# Patient Record
Sex: FEMALE | Race: WHITE | Employment: UNEMPLOYED | URBAN - METROPOLITAN AREA
[De-identification: names, ages, dates, MRNs, and addresses within clinical notes are randomized per-mention and may not be internally consistent; named-entity substitution may affect disease eponyms.]

---

## 2021-04-27 ENCOUNTER — LAB ENCOUNTER (OUTPATIENT)
Dept: LAB | Facility: HOSPITAL | Age: 46
End: 2021-04-27
Attending: INTERNAL MEDICINE
Payer: MEDICAID

## 2021-04-27 DIAGNOSIS — Z01.818 PREOPERATIVE TESTING: ICD-10-CM

## 2021-04-29 ENCOUNTER — HOSPITAL ENCOUNTER (OUTPATIENT)
Facility: HOSPITAL | Age: 46
Setting detail: HOSPITAL OUTPATIENT SURGERY
Discharge: HOME | End: 2021-04-29
Attending: INTERNAL MEDICINE | Admitting: INTERNAL MEDICINE
Payer: MEDICAID

## 2021-04-29 ENCOUNTER — ANESTHESIA (OUTPATIENT)
Dept: ENDOSCOPY | Facility: HOSPITAL | Age: 46
End: 2021-04-29
Payer: MEDICAID

## 2021-04-29 ENCOUNTER — ANESTHESIA EVENT (OUTPATIENT)
Dept: ENDOSCOPY | Facility: HOSPITAL | Age: 46
End: 2021-04-29
Payer: MEDICAID

## 2021-04-29 VITALS
HEIGHT: 64 IN | SYSTOLIC BLOOD PRESSURE: 121 MMHG | OXYGEN SATURATION: 99 % | DIASTOLIC BLOOD PRESSURE: 86 MMHG | BODY MASS INDEX: 19.63 KG/M2 | RESPIRATION RATE: 16 BRPM | WEIGHT: 115 LBS | HEART RATE: 91 BPM | TEMPERATURE: 98 F

## 2021-04-29 DIAGNOSIS — Z01.818 PREOPERATIVE TESTING: Primary | ICD-10-CM

## 2021-04-29 DIAGNOSIS — R19.7 DIARRHEA, UNSPECIFIED TYPE: ICD-10-CM

## 2021-04-29 DIAGNOSIS — K62.89 RECTAL PAIN: ICD-10-CM

## 2021-04-29 DIAGNOSIS — K52.9 CHRONIC COLITIS: ICD-10-CM

## 2021-04-29 PROCEDURE — 81025 URINE PREGNANCY TEST: CPT

## 2021-04-29 PROCEDURE — 88305 TISSUE EXAM BY PATHOLOGIST: CPT | Performed by: INTERNAL MEDICINE

## 2021-04-29 PROCEDURE — 0DBP8ZX EXCISION OF RECTUM, VIA NATURAL OR ARTIFICIAL OPENING ENDOSCOPIC, DIAGNOSTIC: ICD-10-PCS | Performed by: INTERNAL MEDICINE

## 2021-04-29 PROCEDURE — 0DBK8ZX EXCISION OF ASCENDING COLON, VIA NATURAL OR ARTIFICIAL OPENING ENDOSCOPIC, DIAGNOSTIC: ICD-10-PCS | Performed by: INTERNAL MEDICINE

## 2021-04-29 PROCEDURE — 0DBH8ZX EXCISION OF CECUM, VIA NATURAL OR ARTIFICIAL OPENING ENDOSCOPIC, DIAGNOSTIC: ICD-10-PCS | Performed by: INTERNAL MEDICINE

## 2021-04-29 PROCEDURE — 0DBL8ZX EXCISION OF TRANSVERSE COLON, VIA NATURAL OR ARTIFICIAL OPENING ENDOSCOPIC, DIAGNOSTIC: ICD-10-PCS | Performed by: INTERNAL MEDICINE

## 2021-04-29 PROCEDURE — 0DBM8ZX EXCISION OF DESCENDING COLON, VIA NATURAL OR ARTIFICIAL OPENING ENDOSCOPIC, DIAGNOSTIC: ICD-10-PCS | Performed by: INTERNAL MEDICINE

## 2021-04-29 PROCEDURE — 0DBN8ZX EXCISION OF SIGMOID COLON, VIA NATURAL OR ARTIFICIAL OPENING ENDOSCOPIC, DIAGNOSTIC: ICD-10-PCS | Performed by: INTERNAL MEDICINE

## 2021-04-29 RX ORDER — SODIUM CHLORIDE, SODIUM LACTATE, POTASSIUM CHLORIDE, CALCIUM CHLORIDE 600; 310; 30; 20 MG/100ML; MG/100ML; MG/100ML; MG/100ML
INJECTION, SOLUTION INTRAVENOUS CONTINUOUS
Status: DISCONTINUED | OUTPATIENT
Start: 2021-04-29 | End: 2021-04-29

## 2021-04-29 RX ORDER — NALOXONE HYDROCHLORIDE 0.4 MG/ML
80 INJECTION, SOLUTION INTRAMUSCULAR; INTRAVENOUS; SUBCUTANEOUS AS NEEDED
Status: DISCONTINUED | OUTPATIENT
Start: 2021-04-29 | End: 2021-04-29

## 2021-04-29 RX ORDER — LIDOCAINE HYDROCHLORIDE 10 MG/ML
INJECTION, SOLUTION EPIDURAL; INFILTRATION; INTRACAUDAL; PERINEURAL AS NEEDED
Status: DISCONTINUED | OUTPATIENT
Start: 2021-04-29 | End: 2021-04-29 | Stop reason: SURG

## 2021-04-29 RX ADMIN — SODIUM CHLORIDE, SODIUM LACTATE, POTASSIUM CHLORIDE, CALCIUM CHLORIDE: 600; 310; 30; 20 INJECTION, SOLUTION INTRAVENOUS at 13:22:00

## 2021-04-29 RX ADMIN — SODIUM CHLORIDE, SODIUM LACTATE, POTASSIUM CHLORIDE, CALCIUM CHLORIDE: 600; 310; 30; 20 INJECTION, SOLUTION INTRAVENOUS at 12:41:00

## 2021-04-29 RX ADMIN — LIDOCAINE HYDROCHLORIDE 50 MG: 10 INJECTION, SOLUTION EPIDURAL; INFILTRATION; INTRACAUDAL; PERINEURAL at 12:44:00

## 2021-04-29 NOTE — OPERATIVE REPORT
Colonoscopy Operative Report    Milton Sousa Patient Status:  Hospital Outpatient Surgery    11/3/1975 MRN [id number]   Location [facility] ENDOSCOPY LAB SUITES Attending Brandon Maynard MD   [facility] Day # complications. The patient was transferred to the recovery area in stable condition. Quality of Preparation: Adequate  Aronchick Bowel Prep Scale: 2    Findings: See above (post-operative diagnosis)  Recommendations: 1. No changes to diet  2. Changes to m

## 2021-04-29 NOTE — ANESTHESIA PREPROCEDURE EVALUATION
Anesthesia PreOp Note    HPI:     Jamila Willett is a 39year old female who presents for preoperative consultation requested by: Evangelina Valladares MD    Date of Surgery: 4/29/2021    Procedure(s):  COLONOSCOPY  Indication: Rectal pain, Chronic colitis, Diarrhea, un education: Not on file      Highest education level: Not on file    Occupational History      Not on file    Tobacco Use      Smoking status: Never Smoker      Smokeless tobacco: Never Used    Vaping Use      Vaping Use: Never used    Substance and Sexual not taken for this visit. There were no vitals filed for this visit.      Anesthesia Evaluation     Patient summary reviewed and Nursing notes reviewed    Airway   Mallampati: II  TM distance: >3 FB  Neck ROM: full  Dental          Pulmonary - normal exam

## 2021-04-29 NOTE — ANESTHESIA POSTPROCEDURE EVALUATION
Patient: Jacinda Morrison    Procedure Summary     Date: 04/29/21 Room / Location: [address] [place] [address] ENDOSCOPY 05 / [address] [place] [address] ENDOSCOPY    Anesthesia Start: 9897 Anesthesia Stop: 5022    Procedure: COLONOSCOPY (N/A ) Diagnosis:       Rectal pain      Chronic colitis      Diarrhea, u

## 2021-04-29 NOTE — H&P
The H&P dated 4/22/2021 by Abram Anderson MD was reviewed by Abram Anderson MD today 4/29/2021, the patient was examined and no significant changes have occurred in the patient's condition since the H&P was performed.     I discussed with the patient and/or legal No  History of mobility issues: No  History of C-Diff Colitis:No  History of MRSA: No  History of abdominal surgeries:  none     ENDOSCOPIC REPORTS:   Colonoscopy:none  EGD:none  ERCP:none     History of Prep or Sedation intolerance with previous endoscopi REVIEW OF SYSTEMS:      GENERAL: feels well otherwise.   SKIN: denies any unusual skin lesions  EYES: denies any new visual changes or double vision, denies red painful eyes  HEENT: denies any new hearing changes, nasal congestion, sinus pain  LUNGS: uriel g/dL     Bilirubin, Total 0.37 0.00 - 1.20 mg/dL     Alkaline Phosphatase 84 37 - 98 U/L     AST 16 0 - 32 U/L     ALT 19 0 - 33 U/L     GFR CKD-.34 >=60.00 mL/min/1.73 m²   2019 NOVEL CORONAVIRUS (COVID-19), PCR DMG     Specimen: Nasopharyngeal swa plan.     Plan for OC with possible biopsies and possible polypectomy.   The procedure, indications, and risks (including perforation, bleeding, infection, and sedation related complications) were discussed.       PEG preparation, use discussed and instruct

## 2021-05-05 ENCOUNTER — LAB ENCOUNTER (OUTPATIENT)
Dept: LAB | Age: 46
End: 2021-05-05
Attending: COLON & RECTAL SURGERY
Payer: MEDICAID

## 2021-05-05 DIAGNOSIS — Z01.818 PREOPERATIVE TESTING: ICD-10-CM

## 2021-05-06 ENCOUNTER — ANESTHESIA EVENT (OUTPATIENT)
Dept: SURGERY | Facility: HOSPITAL | Age: 46
End: 2021-05-06
Payer: MEDICAID

## 2021-05-06 ENCOUNTER — HOSPITAL ENCOUNTER (OUTPATIENT)
Facility: HOSPITAL | Age: 46
Setting detail: HOSPITAL OUTPATIENT SURGERY
Discharge: HOME | End: 2021-05-06
Attending: COLON & RECTAL SURGERY | Admitting: COLON & RECTAL SURGERY
Payer: MEDICAID

## 2021-05-06 ENCOUNTER — ANESTHESIA (OUTPATIENT)
Dept: SURGERY | Facility: HOSPITAL | Age: 46
End: 2021-05-06
Payer: MEDICAID

## 2021-05-06 VITALS
RESPIRATION RATE: 18 BRPM | OXYGEN SATURATION: 100 % | HEIGHT: 64 IN | TEMPERATURE: 98 F | SYSTOLIC BLOOD PRESSURE: 131 MMHG | BODY MASS INDEX: 18.78 KG/M2 | DIASTOLIC BLOOD PRESSURE: 75 MMHG | WEIGHT: 110 LBS | HEART RATE: 79 BPM

## 2021-05-06 DIAGNOSIS — K50.113: ICD-10-CM

## 2021-05-06 DIAGNOSIS — Z01.818 PREOPERATIVE TESTING: Primary | ICD-10-CM

## 2021-05-06 DIAGNOSIS — K60.3 ANAL FISTULA: ICD-10-CM

## 2021-05-06 PROCEDURE — 0DBQ8ZZ EXCISION OF ANUS, VIA NATURAL OR ARTIFICIAL OPENING ENDOSCOPIC: ICD-10-PCS | Performed by: COLON & RECTAL SURGERY

## 2021-05-06 PROCEDURE — 81025 URINE PREGNANCY TEST: CPT

## 2021-05-06 RX ORDER — HYDROCODONE BITARTRATE AND ACETAMINOPHEN 5; 325 MG/1; MG/1
2 TABLET ORAL AS NEEDED
Status: DISCONTINUED | OUTPATIENT
Start: 2021-05-06 | End: 2021-05-06

## 2021-05-06 RX ORDER — ACETAMINOPHEN 500 MG
1000 TABLET ORAL ONCE
Status: DISCONTINUED | OUTPATIENT
Start: 2021-05-06 | End: 2021-05-06 | Stop reason: HOSPADM

## 2021-05-06 RX ORDER — MORPHINE SULFATE 4 MG/ML
4 INJECTION, SOLUTION INTRAMUSCULAR; INTRAVENOUS EVERY 10 MIN PRN
Status: DISCONTINUED | OUTPATIENT
Start: 2021-05-06 | End: 2021-05-06

## 2021-05-06 RX ORDER — HYDROCODONE BITARTRATE AND ACETAMINOPHEN 5; 325 MG/1; MG/1
1 TABLET ORAL EVERY 4 HOURS PRN
Qty: 30 TABLET | Refills: 0 | Status: SHIPPED | OUTPATIENT
Start: 2021-05-06 | End: 2021-05-16

## 2021-05-06 RX ORDER — HYDROMORPHONE HYDROCHLORIDE 1 MG/ML
0.6 INJECTION, SOLUTION INTRAMUSCULAR; INTRAVENOUS; SUBCUTANEOUS EVERY 5 MIN PRN
Status: DISCONTINUED | OUTPATIENT
Start: 2021-05-06 | End: 2021-05-06

## 2021-05-06 RX ORDER — SODIUM CHLORIDE, SODIUM LACTATE, POTASSIUM CHLORIDE, CALCIUM CHLORIDE 600; 310; 30; 20 MG/100ML; MG/100ML; MG/100ML; MG/100ML
INJECTION, SOLUTION INTRAVENOUS CONTINUOUS
Status: DISCONTINUED | OUTPATIENT
Start: 2021-05-06 | End: 2021-05-06

## 2021-05-06 RX ORDER — MORPHINE SULFATE 10 MG/ML
6 INJECTION, SOLUTION INTRAMUSCULAR; INTRAVENOUS EVERY 10 MIN PRN
Status: DISCONTINUED | OUTPATIENT
Start: 2021-05-06 | End: 2021-05-06

## 2021-05-06 RX ORDER — DEXAMETHASONE SODIUM PHOSPHATE 4 MG/ML
VIAL (ML) INJECTION AS NEEDED
Status: DISCONTINUED | OUTPATIENT
Start: 2021-05-06 | End: 2021-05-06 | Stop reason: SURG

## 2021-05-06 RX ORDER — ONDANSETRON 2 MG/ML
INJECTION INTRAMUSCULAR; INTRAVENOUS AS NEEDED
Status: DISCONTINUED | OUTPATIENT
Start: 2021-05-06 | End: 2021-05-06 | Stop reason: SURG

## 2021-05-06 RX ORDER — MORPHINE SULFATE 4 MG/ML
2 INJECTION, SOLUTION INTRAMUSCULAR; INTRAVENOUS EVERY 10 MIN PRN
Status: DISCONTINUED | OUTPATIENT
Start: 2021-05-06 | End: 2021-05-06

## 2021-05-06 RX ORDER — HYDROMORPHONE HYDROCHLORIDE 1 MG/ML
0.4 INJECTION, SOLUTION INTRAMUSCULAR; INTRAVENOUS; SUBCUTANEOUS EVERY 5 MIN PRN
Status: DISCONTINUED | OUTPATIENT
Start: 2021-05-06 | End: 2021-05-06

## 2021-05-06 RX ORDER — LIDOCAINE HYDROCHLORIDE 10 MG/ML
INJECTION, SOLUTION EPIDURAL; INFILTRATION; INTRACAUDAL; PERINEURAL AS NEEDED
Status: DISCONTINUED | OUTPATIENT
Start: 2021-05-06 | End: 2021-05-06 | Stop reason: SURG

## 2021-05-06 RX ORDER — NALOXONE HYDROCHLORIDE 0.4 MG/ML
80 INJECTION, SOLUTION INTRAMUSCULAR; INTRAVENOUS; SUBCUTANEOUS AS NEEDED
Status: DISCONTINUED | OUTPATIENT
Start: 2021-05-06 | End: 2021-05-06

## 2021-05-06 RX ORDER — HYDROCODONE BITARTRATE AND ACETAMINOPHEN 5; 325 MG/1; MG/1
1 TABLET ORAL AS NEEDED
Status: DISCONTINUED | OUTPATIENT
Start: 2021-05-06 | End: 2021-05-06

## 2021-05-06 RX ORDER — HYDROMORPHONE HYDROCHLORIDE 1 MG/ML
0.2 INJECTION, SOLUTION INTRAMUSCULAR; INTRAVENOUS; SUBCUTANEOUS EVERY 5 MIN PRN
Status: DISCONTINUED | OUTPATIENT
Start: 2021-05-06 | End: 2021-05-06

## 2021-05-06 RX ORDER — ONDANSETRON 2 MG/ML
4 INJECTION INTRAMUSCULAR; INTRAVENOUS ONCE AS NEEDED
Status: DISCONTINUED | OUTPATIENT
Start: 2021-05-06 | End: 2021-05-06

## 2021-05-06 RX ORDER — HALOPERIDOL 5 MG/ML
0.25 INJECTION INTRAMUSCULAR ONCE AS NEEDED
Status: DISCONTINUED | OUTPATIENT
Start: 2021-05-06 | End: 2021-05-06

## 2021-05-06 RX ORDER — ROCURONIUM BROMIDE 10 MG/ML
INJECTION, SOLUTION INTRAVENOUS AS NEEDED
Status: DISCONTINUED | OUTPATIENT
Start: 2021-05-06 | End: 2021-05-06 | Stop reason: SURG

## 2021-05-06 RX ADMIN — SODIUM CHLORIDE, SODIUM LACTATE, POTASSIUM CHLORIDE, CALCIUM CHLORIDE: 600; 310; 30; 20 INJECTION, SOLUTION INTRAVENOUS at 16:06:00

## 2021-05-06 RX ADMIN — SODIUM CHLORIDE, SODIUM LACTATE, POTASSIUM CHLORIDE, CALCIUM CHLORIDE: 600; 310; 30; 20 INJECTION, SOLUTION INTRAVENOUS at 16:50:00

## 2021-05-06 RX ADMIN — ROCURONIUM BROMIDE 50 MG: 10 INJECTION, SOLUTION INTRAVENOUS at 16:09:00

## 2021-05-06 RX ADMIN — DEXAMETHASONE SODIUM PHOSPHATE 8 MG: 4 MG/ML VIAL (ML) INJECTION at 16:43:00

## 2021-05-06 RX ADMIN — ONDANSETRON 4 MG: 2 INJECTION INTRAMUSCULAR; INTRAVENOUS at 16:43:00

## 2021-05-06 RX ADMIN — LIDOCAINE HYDROCHLORIDE 50 MG: 10 INJECTION, SOLUTION EPIDURAL; INFILTRATION; INTRACAUDAL; PERINEURAL at 16:08:00

## 2021-05-06 NOTE — H&P
[facility]  Preop H&P - Colon and Rectal Surgery  Harsh Engle MD    CHIEF COMPLAINT:  Anal fistula     HISTORY OF PRESENT ILLNESS:  Jamila Willett is a 39year old female who presents for evaluation of anal fistula.     She has been experiencing freq loss  Eyes: Corrective lenses   Ears/Nose/Throat: Tinnitus, occasional mouth sores, sore throat, sinus congestion TMJ  Respiratory:  normal  Cardiac/Vascular: Occasions, calf pain with walking  GI: See HPI, occasional decreased appetite, occasional to the out of 5 all extremities bilaterally.    SKIN:  no rashes and no jaundice  PSYCHIATRIC:       Orientation:  normal     Appearance:  normal     Behavior:  normal     Attitude toward examiner:  normal     Affect: normal     Judgment:  Normal    DATA:   COLONO changes identified may be seen in inflammatory bowel disease, infection, diverticulosis, and medications. Clinical and endoscopic correlation is recommended.          MRI Pelvis 5/1/2021  IMPRESSION:   1.  Complex fistula at the 9 o'clock position relative

## 2021-05-06 NOTE — OPERATIVE REPORT
Operative Note    Patient Name: Lalo Bowens    Date of Surgery: 05/06/21     Preoperative Diagnosis: Anal fistula [K60.3]  [facility]'s disease of rectum, with [facility] ([facility] [K50.113]    Postoperative Diagnosis: same    Primary Surgeon: Giselle Hudson    Assistant: anal sphincter traversed by the fistula and to be cut during fistulotomy was estimated to be 15%. Given the presence of Crohn's Disease, we decided to place a seton.  This was done by tying a silk suture to the probe and then a medium blue silastic vessel l

## 2021-05-06 NOTE — DISCHARGE SUMMARY
Outpatient Surgery Brief Discharge Summary         Patient ID:  Rohini Guzman  [id number]  39year old  11/3/1975    Discharge Diagnoses: Anal fistula [K60. 3]Crohn's disease of rectum, with fistula ([facility] [K50.113]    Procedures:   Discharge Procedure Orders

## 2021-05-06 NOTE — ANESTHESIA PROCEDURE NOTES
Airway  Date/Time: 5/6/2021 4:15 PM  Urgency: Elective    Airway not difficult    General Information and Staff    Patient location during procedure: OR  Anesthesiologist: Demetrice Waller MD  Resident/CRNA: Loretta Hook CRNA  Performed: CRNA     Indicat

## 2021-05-06 NOTE — ANESTHESIA POSTPROCEDURE EVALUATION
Patient: Marisabel Sargent    Procedure Summary     Date: 05/06/21 Room / Location: [facility] MAIN OR [address] [place] [address] MAIN OR    Anesthesia Start: 4837 Anesthesia Stop: 2680    Procedure: PROCTOSCOPY, ANORECTAL EXAM UNDER ANESTHEIA,  FISTULOTOMY (N/A ) Diagnosis:       Anal

## 2021-05-06 NOTE — ANESTHESIA PREPROCEDURE EVALUATION
Anesthesia PreOp Note    HPI:     Clay Huang is a 39year old female who presents for preoperative consultation requested by: Chitra Collier MD    Date of Surgery: 5/6/2021    Procedure(s):  PROCTOSCOPY, ANORECTAL EXAM UNDER ANESTHEIA,  FISTULOTOMY  Indicat mouth daily. , Disp: , Rfl: , Past Week at Unknown time  Vitamin A, [id number], (VITAMIN A OR), Take by mouth daily. , Disp: , Rfl:   diazepam 2 MG Oral Tab, Take one tab 15 minutes prior to MRI, Disp: 1 tablet, Rfl: 0  Potassium Chloride ER 10 MEQ Oral Tab of Exercise per Week:       Minutes of Exercise per Session:   Stress:       Feeling of Stress :   Social Connections:       Frequency of Communication with Friends and Family:       Frequency of Social Gatherings with Friends and Family:       Attends Rel Neuro/Psych    (+)  anxiety/panic attacks,  depression,       GI/Hepatic/Renal      Comments: + anal fistula    Endo/Other      Comments: + cleft palate  Abdominal  - normal exam               Anesthesia Plan:   ASA:  2  Plan:   General  Monitors and Lines

## 2021-05-12 PROBLEM — K52.9 COLITIS: Status: ACTIVE | Noted: 2021-05-12

## 2021-05-21 RX ORDER — DIPHENHYDRAMINE HYDROCHLORIDE 50 MG/ML
25 INJECTION INTRAMUSCULAR; INTRAVENOUS ONCE
Status: CANCELLED | OUTPATIENT
Start: 2021-05-21

## 2021-05-24 ENCOUNTER — OFFICE VISIT (OUTPATIENT)
Dept: HEMATOLOGY/ONCOLOGY | Facility: HOSPITAL | Age: 46
End: 2021-05-24
Attending: INTERNAL MEDICINE
Payer: MEDICAID

## 2021-05-24 VITALS
HEART RATE: 79 BPM | SYSTOLIC BLOOD PRESSURE: 101 MMHG | BODY MASS INDEX: 18.78 KG/M2 | OXYGEN SATURATION: 99 % | WEIGHT: 110 LBS | DIASTOLIC BLOOD PRESSURE: 62 MMHG | HEIGHT: 64 IN | RESPIRATION RATE: 16 BRPM | TEMPERATURE: 98 F

## 2021-05-24 DIAGNOSIS — K52.9 COLITIS: Primary | ICD-10-CM

## 2021-05-24 PROCEDURE — 96415 CHEMO IV INFUSION ADDL HR: CPT

## 2021-05-24 PROCEDURE — 96413 CHEMO IV INFUSION 1 HR: CPT

## 2021-05-24 RX ORDER — ACETAMINOPHEN 325 MG/1
650 TABLET ORAL ONCE
Status: CANCELLED | OUTPATIENT
Start: 2021-06-07

## 2021-05-24 RX ORDER — ACETAMINOPHEN 325 MG/1
650 TABLET ORAL ONCE
Status: COMPLETED | OUTPATIENT
Start: 2021-05-24 | End: 2021-05-24

## 2021-05-24 RX ORDER — DIPHENHYDRAMINE HYDROCHLORIDE 50 MG/ML
25 INJECTION INTRAMUSCULAR; INTRAVENOUS ONCE
Status: CANCELLED | OUTPATIENT
Start: 2021-06-07

## 2021-05-24 RX ORDER — DIPHENHYDRAMINE HCL 25 MG
CAPSULE ORAL
Status: COMPLETED
Start: 2021-05-24 | End: 2021-05-24

## 2021-05-24 RX ORDER — DIPHENHYDRAMINE HCL 25 MG
25 CAPSULE ORAL ONCE
Status: COMPLETED | OUTPATIENT
Start: 2021-05-24 | End: 2021-05-24

## 2021-05-24 RX ORDER — ACETAMINOPHEN 325 MG/1
TABLET ORAL
Status: COMPLETED
Start: 2021-05-24 | End: 2021-05-24

## 2021-05-24 RX ORDER — DIPHENHYDRAMINE HCL 25 MG
25 CAPSULE ORAL ONCE
Status: CANCELLED | OUTPATIENT
Start: 2021-06-07

## 2021-05-24 RX ADMIN — ACETAMINOPHEN 650 MG: 325 TABLET ORAL at 09:45:00

## 2021-05-24 RX ADMIN — DIPHENHYDRAMINE HCL 25 MG: 25 MG CAPSULE ORAL at 09:46:00

## 2021-05-24 NOTE — PROGRESS NOTES
Susanna Luz arrives to infusion area for her first dose of Remicade to treat Crohn's. States she has been having periods of diarrhea and abdominal cramping. She did have a Seton placed to keep her anal fistula from closing.  She states she just keeps a gauze dress

## 2021-06-07 ENCOUNTER — OFFICE VISIT (OUTPATIENT)
Dept: HEMATOLOGY/ONCOLOGY | Facility: HOSPITAL | Age: 46
End: 2021-06-07
Attending: INTERNAL MEDICINE
Payer: MEDICAID

## 2021-06-07 VITALS
DIASTOLIC BLOOD PRESSURE: 68 MMHG | HEART RATE: 65 BPM | SYSTOLIC BLOOD PRESSURE: 113 MMHG | OXYGEN SATURATION: 98 % | TEMPERATURE: 98 F | RESPIRATION RATE: 16 BRPM

## 2021-06-07 DIAGNOSIS — K52.9 COLITIS: Primary | ICD-10-CM

## 2021-06-07 PROCEDURE — 96415 CHEMO IV INFUSION ADDL HR: CPT

## 2021-06-07 PROCEDURE — 96413 CHEMO IV INFUSION 1 HR: CPT

## 2021-06-07 RX ORDER — ACETAMINOPHEN 325 MG/1
650 TABLET ORAL ONCE
Status: COMPLETED | OUTPATIENT
Start: 2021-06-07 | End: 2021-06-07

## 2021-06-07 RX ORDER — DIPHENHYDRAMINE HCL 25 MG
25 CAPSULE ORAL ONCE
Status: COMPLETED | OUTPATIENT
Start: 2021-06-07 | End: 2021-06-07

## 2021-06-07 RX ORDER — DIPHENHYDRAMINE HCL 25 MG
25 CAPSULE ORAL ONCE
Status: CANCELLED | OUTPATIENT
Start: 2021-06-21

## 2021-06-07 RX ORDER — DIPHENHYDRAMINE HYDROCHLORIDE 50 MG/ML
25 INJECTION INTRAMUSCULAR; INTRAVENOUS ONCE
Status: CANCELLED | OUTPATIENT
Start: 2021-06-21

## 2021-06-07 RX ORDER — ACETAMINOPHEN 325 MG/1
650 TABLET ORAL ONCE
Status: CANCELLED | OUTPATIENT
Start: 2021-06-21

## 2021-06-07 RX ORDER — ACETAMINOPHEN 325 MG/1
TABLET ORAL
Status: COMPLETED
Start: 2021-06-07 | End: 2021-06-07

## 2021-06-07 RX ORDER — DIPHENHYDRAMINE HCL 25 MG
CAPSULE ORAL
Status: COMPLETED
Start: 2021-06-07 | End: 2021-06-07

## 2021-06-07 RX ADMIN — DIPHENHYDRAMINE HCL 25 MG: 25 MG CAPSULE ORAL at 09:31:00

## 2021-06-07 RX ADMIN — ACETAMINOPHEN 650 MG: 325 TABLET ORAL at 09:32:00

## 2021-06-07 NOTE — PROGRESS NOTES
Radha Giang arrives to infusion area for her first dose of Remicade to treat Crohn's. States she has been having periods of diarrhea and abdominal cramping. She did say she the diarrhea is less since her first Remicade 2 weeks ago.   She did have a Seton placed to

## 2021-07-06 ENCOUNTER — OFFICE VISIT (OUTPATIENT)
Dept: HEMATOLOGY/ONCOLOGY | Facility: HOSPITAL | Age: 46
End: 2021-07-06
Attending: INTERNAL MEDICINE
Payer: MEDICAID

## 2021-07-06 VITALS
BODY MASS INDEX: 19 KG/M2 | RESPIRATION RATE: 16 BRPM | OXYGEN SATURATION: 99 % | DIASTOLIC BLOOD PRESSURE: 65 MMHG | SYSTOLIC BLOOD PRESSURE: 130 MMHG | HEART RATE: 74 BPM | TEMPERATURE: 98 F | WEIGHT: 112.19 LBS

## 2021-07-06 DIAGNOSIS — K52.9 COLITIS: Primary | ICD-10-CM

## 2021-07-06 PROCEDURE — 96415 CHEMO IV INFUSION ADDL HR: CPT

## 2021-07-06 PROCEDURE — 96413 CHEMO IV INFUSION 1 HR: CPT

## 2021-07-06 RX ORDER — DIPHENHYDRAMINE HYDROCHLORIDE 50 MG/ML
25 INJECTION INTRAMUSCULAR; INTRAVENOUS ONCE
Status: CANCELLED | OUTPATIENT
Start: 2021-07-20

## 2021-07-06 RX ORDER — ACETAMINOPHEN 325 MG/1
650 TABLET ORAL ONCE
Status: CANCELLED | OUTPATIENT
Start: 2021-07-20

## 2021-07-06 RX ORDER — DIPHENHYDRAMINE HCL 25 MG
CAPSULE ORAL
Status: COMPLETED
Start: 2021-07-06 | End: 2021-07-06

## 2021-07-06 RX ORDER — ACETAMINOPHEN 325 MG/1
TABLET ORAL
Status: COMPLETED
Start: 2021-07-06 | End: 2021-07-06

## 2021-07-06 RX ORDER — ACETAMINOPHEN 325 MG/1
650 TABLET ORAL ONCE
Status: COMPLETED | OUTPATIENT
Start: 2021-07-06 | End: 2021-07-06

## 2021-07-06 RX ORDER — DIPHENHYDRAMINE HCL 25 MG
25 CAPSULE ORAL ONCE
Status: COMPLETED | OUTPATIENT
Start: 2021-07-06 | End: 2021-07-06

## 2021-07-06 RX ORDER — DIPHENHYDRAMINE HCL 25 MG
25 CAPSULE ORAL ONCE
Status: CANCELLED | OUTPATIENT
Start: 2021-07-20

## 2021-07-06 RX ADMIN — ACETAMINOPHEN 650 MG: 325 TABLET ORAL at 10:30:00

## 2021-07-06 RX ADMIN — DIPHENHYDRAMINE HCL 25 MG: 25 MG CAPSULE ORAL at 10:30:00

## 2021-07-06 NOTE — PROGRESS NOTES
Maira Cobian arrives to infusion area for week 6 of Remicade to treat Crohn's. States she feels her GI symptoms are now improving. Continues with loose stool, but without pain and less frequent. She did have a Seton placed to keep her anal fistula from closing.

## 2021-08-31 ENCOUNTER — OFFICE VISIT (OUTPATIENT)
Dept: HEMATOLOGY/ONCOLOGY | Facility: HOSPITAL | Age: 46
End: 2021-08-31
Attending: INTERNAL MEDICINE
Payer: MEDICAID

## 2021-08-31 VITALS
RESPIRATION RATE: 16 BRPM | TEMPERATURE: 98 F | DIASTOLIC BLOOD PRESSURE: 67 MMHG | WEIGHT: 130.81 LBS | OXYGEN SATURATION: 100 % | HEART RATE: 62 BPM | SYSTOLIC BLOOD PRESSURE: 126 MMHG | BODY MASS INDEX: 22 KG/M2

## 2021-08-31 DIAGNOSIS — K52.9 COLITIS: Primary | ICD-10-CM

## 2021-08-31 PROCEDURE — 96415 CHEMO IV INFUSION ADDL HR: CPT

## 2021-08-31 PROCEDURE — 96413 CHEMO IV INFUSION 1 HR: CPT

## 2021-08-31 RX ORDER — DIPHENHYDRAMINE HYDROCHLORIDE 50 MG/ML
25 INJECTION INTRAMUSCULAR; INTRAVENOUS ONCE
Status: CANCELLED | OUTPATIENT
Start: 2021-09-14

## 2021-08-31 RX ORDER — DIPHENHYDRAMINE HCL 25 MG
25 CAPSULE ORAL ONCE
Status: CANCELLED | OUTPATIENT
Start: 2021-09-14

## 2021-08-31 RX ORDER — DIPHENHYDRAMINE HCL 25 MG
CAPSULE ORAL
Status: COMPLETED
Start: 2021-08-31 | End: 2021-08-31

## 2021-08-31 RX ORDER — ACETAMINOPHEN 325 MG/1
650 TABLET ORAL ONCE
Status: CANCELLED | OUTPATIENT
Start: 2021-09-14

## 2021-08-31 RX ORDER — ACETAMINOPHEN 325 MG/1
TABLET ORAL
Status: COMPLETED
Start: 2021-08-31 | End: 2021-08-31

## 2021-08-31 RX ORDER — ACETAMINOPHEN 325 MG/1
650 TABLET ORAL ONCE
Status: COMPLETED | OUTPATIENT
Start: 2021-08-31 | End: 2021-08-31

## 2021-08-31 RX ORDER — DIPHENHYDRAMINE HCL 25 MG
25 CAPSULE ORAL ONCE
Status: COMPLETED | OUTPATIENT
Start: 2021-08-31 | End: 2021-08-31

## 2021-08-31 RX ADMIN — ACETAMINOPHEN 650 MG: 325 TABLET ORAL at 09:20:00

## 2021-08-31 RX ADMIN — DIPHENHYDRAMINE HCL 25 MG: 25 MG CAPSULE ORAL at 09:20:00

## 2021-08-31 NOTE — PROGRESS NOTES
Thuy Ring arrives to infusion area for q8w of Remicade to treat Crohn's. States she feels her GI symptoms are now improving. Feels her stools are less frequent and more formed. Denies any recent infections or fevers.  Did note a dermatitis to beds of arms an

## 2021-10-26 ENCOUNTER — OFFICE VISIT (OUTPATIENT)
Dept: HEMATOLOGY/ONCOLOGY | Facility: HOSPITAL | Age: 46
End: 2021-10-26
Attending: INTERNAL MEDICINE
Payer: MEDICAID

## 2021-10-26 VITALS
HEART RATE: 66 BPM | DIASTOLIC BLOOD PRESSURE: 69 MMHG | SYSTOLIC BLOOD PRESSURE: 115 MMHG | WEIGHT: 138.81 LBS | TEMPERATURE: 98 F | BODY MASS INDEX: 24 KG/M2 | OXYGEN SATURATION: 99 % | RESPIRATION RATE: 16 BRPM

## 2021-10-26 DIAGNOSIS — K52.9 COLITIS: Primary | ICD-10-CM

## 2021-10-26 PROCEDURE — 96413 CHEMO IV INFUSION 1 HR: CPT

## 2021-10-26 PROCEDURE — 96415 CHEMO IV INFUSION ADDL HR: CPT

## 2021-10-26 RX ORDER — ACETAMINOPHEN 325 MG/1
650 TABLET ORAL ONCE
Status: CANCELLED | OUTPATIENT
Start: 2021-11-09

## 2021-10-26 RX ORDER — DIPHENHYDRAMINE HCL 25 MG
25 CAPSULE ORAL ONCE
Status: CANCELLED | OUTPATIENT
Start: 2021-11-09

## 2021-10-26 RX ORDER — DIPHENHYDRAMINE HYDROCHLORIDE 50 MG/ML
25 INJECTION INTRAMUSCULAR; INTRAVENOUS ONCE
OUTPATIENT
Start: 2021-11-09

## 2021-10-26 RX ORDER — ACETAMINOPHEN 325 MG/1
TABLET ORAL
Status: COMPLETED
Start: 2021-10-26 | End: 2021-10-26

## 2021-10-26 RX ORDER — DIPHENHYDRAMINE HCL 25 MG
CAPSULE ORAL
Status: DISCONTINUED
Start: 2021-10-26 | End: 2021-10-26

## 2021-10-26 RX ORDER — DIPHENHYDRAMINE HCL 25 MG
25 CAPSULE ORAL ONCE
Status: COMPLETED | OUTPATIENT
Start: 2021-10-26 | End: 2021-10-26

## 2021-10-26 RX ORDER — ACETAMINOPHEN 325 MG/1
650 TABLET ORAL ONCE
Status: COMPLETED | OUTPATIENT
Start: 2021-10-26 | End: 2021-10-26

## 2021-10-26 RX ADMIN — ACETAMINOPHEN 650 MG: 325 TABLET ORAL at 09:24:00

## 2021-10-26 RX ADMIN — DIPHENHYDRAMINE HCL 25 MG: 25 MG CAPSULE ORAL at 09:23:00

## 2021-10-26 NOTE — PROGRESS NOTES
Kike Suarez arrives to infusion area for Q8 week Remicade to treat Crohn's. States she feels her GI symptoms continue to improve while on Remicade. Denies any recent infections or fevers. Still having generalized rash.   Pt states MD aware and is having her christopher

## 2021-12-21 ENCOUNTER — OFFICE VISIT (OUTPATIENT)
Dept: HEMATOLOGY/ONCOLOGY | Facility: HOSPITAL | Age: 46
End: 2021-12-21
Attending: INTERNAL MEDICINE
Payer: MEDICAID

## 2021-12-21 VITALS
BODY MASS INDEX: 24 KG/M2 | HEART RATE: 57 BPM | DIASTOLIC BLOOD PRESSURE: 71 MMHG | SYSTOLIC BLOOD PRESSURE: 125 MMHG | WEIGHT: 140.63 LBS

## 2021-12-21 DIAGNOSIS — K52.9 COLITIS: Primary | ICD-10-CM

## 2021-12-21 PROCEDURE — 96413 CHEMO IV INFUSION 1 HR: CPT

## 2021-12-21 PROCEDURE — 96415 CHEMO IV INFUSION ADDL HR: CPT

## 2021-12-21 RX ORDER — DIPHENHYDRAMINE HCL 25 MG
CAPSULE ORAL
Status: COMPLETED
Start: 2021-12-21 | End: 2021-12-21

## 2021-12-21 RX ORDER — DIPHENHYDRAMINE HCL 25 MG
25 CAPSULE ORAL ONCE
OUTPATIENT
Start: 2022-01-04

## 2021-12-21 RX ORDER — DIPHENHYDRAMINE HYDROCHLORIDE 50 MG/ML
25 INJECTION INTRAMUSCULAR; INTRAVENOUS ONCE
OUTPATIENT
Start: 2022-01-04

## 2021-12-21 RX ORDER — ACETAMINOPHEN 325 MG/1
650 TABLET ORAL ONCE
Status: DISCONTINUED | OUTPATIENT
Start: 2021-12-21 | End: 2021-12-21

## 2021-12-21 RX ORDER — ACETAMINOPHEN 325 MG/1
650 TABLET ORAL ONCE
OUTPATIENT
Start: 2022-01-04

## 2021-12-21 RX ORDER — DIPHENHYDRAMINE HCL 25 MG
25 CAPSULE ORAL ONCE
Status: COMPLETED | OUTPATIENT
Start: 2021-12-21 | End: 2021-12-21

## 2021-12-21 RX ADMIN — DIPHENHYDRAMINE HCL 25 MG: 25 MG CAPSULE ORAL at 09:30:00

## 2021-12-21 NOTE — PROGRESS NOTES
Presents for remicade infusion ordered for colitis every 8 weeks. Per Marcella Greenwood, doing well on remicade with recent colonoscopy showing that. Recently had dermatitis on chest and scalp. Started on ointments, scalp oil and prescription shampoo as needed.   Took

## 2022-02-15 ENCOUNTER — OFFICE VISIT (OUTPATIENT)
Dept: HEMATOLOGY/ONCOLOGY | Facility: HOSPITAL | Age: 47
End: 2022-02-15
Attending: INTERNAL MEDICINE
Payer: MEDICAID

## 2022-02-15 VITALS
RESPIRATION RATE: 16 BRPM | OXYGEN SATURATION: 99 % | SYSTOLIC BLOOD PRESSURE: 136 MMHG | HEART RATE: 86 BPM | BODY MASS INDEX: 23 KG/M2 | WEIGHT: 136.69 LBS | TEMPERATURE: 98 F | DIASTOLIC BLOOD PRESSURE: 79 MMHG

## 2022-02-15 DIAGNOSIS — K52.9 COLITIS: Primary | ICD-10-CM

## 2022-02-15 PROCEDURE — 96415 CHEMO IV INFUSION ADDL HR: CPT

## 2022-02-15 PROCEDURE — 96413 CHEMO IV INFUSION 1 HR: CPT

## 2022-02-15 RX ORDER — DIPHENHYDRAMINE HCL 25 MG
CAPSULE ORAL
Status: COMPLETED
Start: 2022-02-15 | End: 2022-02-15

## 2022-02-15 RX ORDER — DIPHENHYDRAMINE HCL 25 MG
25 CAPSULE ORAL ONCE
Status: COMPLETED | OUTPATIENT
Start: 2022-02-15 | End: 2022-02-15

## 2022-02-15 RX ORDER — DIPHENHYDRAMINE HYDROCHLORIDE 50 MG/ML
25 INJECTION INTRAMUSCULAR; INTRAVENOUS ONCE
OUTPATIENT
Start: 2022-03-01

## 2022-02-15 RX ORDER — DIPHENHYDRAMINE HCL 25 MG
25 CAPSULE ORAL ONCE
OUTPATIENT
Start: 2022-03-01

## 2022-02-15 RX ORDER — ASCORBIC ACID 500 MG
1000 TABLET ORAL DAILY
COMMUNITY

## 2022-02-15 RX ORDER — ACETAMINOPHEN 325 MG/1
650 TABLET ORAL ONCE
OUTPATIENT
Start: 2022-03-01

## 2022-02-15 RX ORDER — ACETAMINOPHEN 325 MG/1
650 TABLET ORAL ONCE
Status: DISCONTINUED | OUTPATIENT
Start: 2022-02-15 | End: 2022-02-15

## 2022-02-15 RX ADMIN — DIPHENHYDRAMINE HCL 25 MG: 25 MG CAPSULE ORAL at 09:26:00

## 2022-02-15 NOTE — PROGRESS NOTES
Pt here for remicade. Takes own liquid acetaminophen due to difficulty swallowing larger tablets. Appeared to tolerate infusion, no signs and symptoms of adverse reaction noted. Discharged home ambulating independently.

## 2022-02-18 ENCOUNTER — ORDER TRANSCRIPTION (OUTPATIENT)
Dept: ADMINISTRATIVE | Facility: HOSPITAL | Age: 47
End: 2022-02-18

## 2022-03-08 ENCOUNTER — HOSPITAL ENCOUNTER (OUTPATIENT)
Dept: CT IMAGING | Age: 47
Discharge: HOME | End: 2022-03-08
Attending: INTERNAL MEDICINE

## 2022-03-08 DIAGNOSIS — Z13.6 SCREENING FOR CARDIOVASCULAR CONDITION: ICD-10-CM

## 2022-04-01 ENCOUNTER — LAB ENCOUNTER (OUTPATIENT)
Dept: LAB | Age: 47
End: 2022-04-01
Attending: INTERNAL MEDICINE
Payer: MEDICAID

## 2022-04-01 DIAGNOSIS — Z01.818 PRE-OP TESTING: ICD-10-CM

## 2022-04-02 LAB — SARS-COV-2 RNA RESP QL NAA+PROBE: NOT DETECTED

## 2022-04-04 ENCOUNTER — ANESTHESIA EVENT (OUTPATIENT)
Dept: ENDOSCOPY | Facility: HOSPITAL | Age: 47
End: 2022-04-04
Payer: MEDICAID

## 2022-04-04 ENCOUNTER — HOSPITAL ENCOUNTER (OUTPATIENT)
Facility: HOSPITAL | Age: 47
Setting detail: HOSPITAL OUTPATIENT SURGERY
Discharge: HOME | End: 2022-04-04
Attending: INTERNAL MEDICINE | Admitting: INTERNAL MEDICINE
Payer: MEDICAID

## 2022-04-04 ENCOUNTER — ANESTHESIA (OUTPATIENT)
Dept: ENDOSCOPY | Facility: HOSPITAL | Age: 47
End: 2022-04-04
Payer: MEDICAID

## 2022-04-04 VITALS
HEIGHT: 64 IN | DIASTOLIC BLOOD PRESSURE: 85 MMHG | TEMPERATURE: 98 F | HEART RATE: 89 BPM | SYSTOLIC BLOOD PRESSURE: 125 MMHG | BODY MASS INDEX: 23.39 KG/M2 | OXYGEN SATURATION: 100 % | RESPIRATION RATE: 16 BRPM | WEIGHT: 137 LBS

## 2022-04-04 DIAGNOSIS — R13.19 ESOPHAGEAL DYSPHAGIA: ICD-10-CM

## 2022-04-04 DIAGNOSIS — R12 HEARTBURN: ICD-10-CM

## 2022-04-04 DIAGNOSIS — Z01.818 PRE-OP TESTING: Primary | ICD-10-CM

## 2022-04-04 LAB — B-HCG UR QL: NEGATIVE

## 2022-04-04 PROCEDURE — 88312 SPECIAL STAINS GROUP 1: CPT | Performed by: INTERNAL MEDICINE

## 2022-04-04 PROCEDURE — 36415 COLL VENOUS BLD VENIPUNCTURE: CPT | Performed by: INTERNAL MEDICINE

## 2022-04-04 PROCEDURE — 81025 URINE PREGNANCY TEST: CPT

## 2022-04-04 PROCEDURE — 0DB28ZX EXCISION OF MIDDLE ESOPHAGUS, VIA NATURAL OR ARTIFICIAL OPENING ENDOSCOPIC, DIAGNOSTIC: ICD-10-PCS | Performed by: INTERNAL MEDICINE

## 2022-04-04 PROCEDURE — 88305 TISSUE EXAM BY PATHOLOGIST: CPT | Performed by: INTERNAL MEDICINE

## 2022-04-04 PROCEDURE — 0DB38ZX EXCISION OF LOWER ESOPHAGUS, VIA NATURAL OR ARTIFICIAL OPENING ENDOSCOPIC, DIAGNOSTIC: ICD-10-PCS | Performed by: INTERNAL MEDICINE

## 2022-04-04 RX ORDER — LIDOCAINE HYDROCHLORIDE 10 MG/ML
INJECTION, SOLUTION EPIDURAL; INFILTRATION; INTRACAUDAL; PERINEURAL AS NEEDED
Status: DISCONTINUED | OUTPATIENT
Start: 2022-04-04 | End: 2022-04-04 | Stop reason: SURG

## 2022-04-04 RX ORDER — SODIUM CHLORIDE, SODIUM LACTATE, POTASSIUM CHLORIDE, CALCIUM CHLORIDE 600; 310; 30; 20 MG/100ML; MG/100ML; MG/100ML; MG/100ML
INJECTION, SOLUTION INTRAVENOUS CONTINUOUS PRN
Status: DISCONTINUED | OUTPATIENT
Start: 2022-04-04 | End: 2022-04-04 | Stop reason: SURG

## 2022-04-04 RX ADMIN — LIDOCAINE HYDROCHLORIDE 50 MG: 10 INJECTION, SOLUTION EPIDURAL; INFILTRATION; INTRACAUDAL; PERINEURAL at 14:39:00

## 2022-04-04 RX ADMIN — SODIUM CHLORIDE, SODIUM LACTATE, POTASSIUM CHLORIDE, CALCIUM CHLORIDE: 600; 310; 30; 20 INJECTION, SOLUTION INTRAVENOUS at 14:39:00

## 2022-04-12 ENCOUNTER — OFFICE VISIT (OUTPATIENT)
Dept: HEMATOLOGY/ONCOLOGY | Facility: HOSPITAL | Age: 47
End: 2022-04-12
Attending: INTERNAL MEDICINE
Payer: MEDICAID

## 2022-04-12 VITALS
OXYGEN SATURATION: 99 % | DIASTOLIC BLOOD PRESSURE: 78 MMHG | SYSTOLIC BLOOD PRESSURE: 125 MMHG | BODY MASS INDEX: 23 KG/M2 | RESPIRATION RATE: 16 BRPM | WEIGHT: 135.81 LBS | HEART RATE: 70 BPM | TEMPERATURE: 98 F

## 2022-04-12 DIAGNOSIS — K52.9 COLITIS: Primary | ICD-10-CM

## 2022-04-12 PROCEDURE — 96415 CHEMO IV INFUSION ADDL HR: CPT

## 2022-04-12 PROCEDURE — 96413 CHEMO IV INFUSION 1 HR: CPT

## 2022-04-12 RX ORDER — DIPHENHYDRAMINE HCL 25 MG
25 CAPSULE ORAL ONCE
Status: CANCELLED | OUTPATIENT
Start: 2022-04-26

## 2022-04-12 RX ORDER — ACETAMINOPHEN 325 MG/1
650 TABLET ORAL ONCE
Status: DISCONTINUED | OUTPATIENT
Start: 2022-04-12 | End: 2022-04-12

## 2022-04-12 RX ORDER — ACETAMINOPHEN 325 MG/1
650 TABLET ORAL ONCE
Status: CANCELLED | OUTPATIENT
Start: 2022-04-26

## 2022-04-12 RX ORDER — DIPHENHYDRAMINE HCL 25 MG
CAPSULE ORAL
Status: COMPLETED
Start: 2022-04-12 | End: 2022-04-12

## 2022-04-12 RX ORDER — DIPHENHYDRAMINE HCL 25 MG
25 CAPSULE ORAL ONCE
Status: COMPLETED | OUTPATIENT
Start: 2022-04-12 | End: 2022-04-12

## 2022-04-12 RX ORDER — DIPHENHYDRAMINE HYDROCHLORIDE 50 MG/ML
25 INJECTION INTRAMUSCULAR; INTRAVENOUS ONCE
OUTPATIENT
Start: 2022-04-26

## 2022-04-12 RX ADMIN — DIPHENHYDRAMINE HCL 25 MG: 25 MG CAPSULE ORAL at 09:30:00

## 2025-01-06 NOTE — PROGRESS NOTES
Presents for remicade infusion ordered for colitis every 8 weeks. Denies any recent GI problems. States she has been much better since starting Remicade. Pt took her own liquid Tylenol. Benadryl 25mg given p.o. PIV started left AC, excellent blood return. Remicade infused over 2 hours with appropriate filter. Tolerated infusion well. Pt instructed to have her MD send another script for her Remicade. She states she will be seeing him soon and will do that. PIV removed, no redness or swelling at site. 2x2 and coban to site. Discharged home.
5889

## (undated) DIAGNOSIS — Z13.6 SCREENING FOR CARDIOVASCULAR CONDITION: Primary | ICD-10-CM

## (undated) DEVICE — TAPE POROUS CLOTH 3X10 YD

## (undated) DEVICE — SNARE OPTMZ PLPCTM TRP

## (undated) DEVICE — SOL  .9 1000ML BTL

## (undated) DEVICE — DRAPE SHEET LAPAROTOMY

## (undated) DEVICE — 3M™ STERI-STRIP™ COMPOUND BENZOIN TINCTURE 40 BAGS/CARTON 4 CARTONS/CASE C1544: Brand: 3M™ STERI-STRIP™

## (undated) DEVICE — SUTURE SILK 2-0 SA65H

## (undated) DEVICE — BETADINE SOL 32 OZ 10% POVIDON

## (undated) DEVICE — INTENDED TO BE USED TO OCCLUDE, RETRACT AND IDENTIFY ARTERIES, VEINS, TENDONS AND NERVES IN SURGICAL PROCEDURES: Brand: STERION®  VESSEL LOOP

## (undated) DEVICE — GOWN SURG AERO BLUE PERF XLG

## (undated) DEVICE — PAD ALC 43.5X24IN PREP  LF

## (undated) DEVICE — MEDI-VAC NON-CONDUCTIVE SUCTION TUBING 6MM X 1.8M (6FT.) L: Brand: CARDINAL HEALTH

## (undated) DEVICE — MINOR GENERAL: Brand: MEDLINE INDUSTRIES, INC.

## (undated) DEVICE — CONMED SCOPE SAVER BITE BLOCK, 20X27 MM: Brand: SCOPE SAVER

## (undated) DEVICE — FLEXIBLE YANKAUER,MEDIUM TIP, NO VACUUM CONTROL: Brand: ARGYLE

## (undated) DEVICE — MEGADYNE E-Z CLEAN 2.5\" BLADE

## (undated) DEVICE — FORCEP RADIAL JAW 4

## (undated) DEVICE — 3 ML SYRINGE LUER-LOCK TIP: Brand: MONOJECT

## (undated) DEVICE — MATERNITY KNIT PANTS,SEAMLESS: Brand: WINGS

## (undated) DEVICE — 6 ML SYRINGE LUER-LOCK TIP: Brand: MONOJECT

## (undated) DEVICE — STERILE SURGICAL LUBRICANT, METAL TUBE: Brand: SURGILUBE

## (undated) DEVICE — LINE MNTR ADLT SET O2 INTMD

## (undated) DEVICE — TRAY SKIN PREP PVP-1

## (undated) DEVICE — KIT CLEAN ENDOKIT 1.1OZ GOWNX2

## (undated) DEVICE — GAMMEX® PI HYBRID SIZE 7.5, STERILE POWDER-FREE SURGICAL GLOVE, POLYISOPRENE AND NEOPRENE BLEND: Brand: GAMMEX

## (undated) DEVICE — Device: Brand: CUSTOM PROCEDURE KIT

## (undated) DEVICE — ADHESIVE MASTISOL 2/3CC VL

## (undated) DEVICE — KIT ENDO ORCAPOD 160/180/190

## (undated) DEVICE — SNARE 9MM 230CM 2.4MM EXACTO

## (undated) NOTE — LETTER
[address] [place] [address] Rd, [place], [place]       AUTHORIZATION FOR SURGICAL OPERATION OR PROCEDURE    1.  I hereby authorize Dr. Chandler Chisholm MD,  my Physician(s) and whomever may be designated as the doctor's Assistant, to perform the f blood, or a directed donor transfusion, I will discuss this with my         Physician.   5.   I consent to the photographing of procedure(s) to be performed for the purposes of advancing medicine, science         and/or education, provided my identity is no (Relationship to Patient)      _______________________________________________________________ ____________________________  (Witness signature)                                                                                                  (Date)

## (undated) NOTE — LETTER
[address], [place]  Authorization for Invasive Procedures  1. I hereby authorize Dr. Shana Rangel , my physician and whomever may be designated as the doctor's assistant, to perform the following operation and/or procedure:  Esophagogastroduodenoscopy with possible dilation on Claudia Otero at [facility].    2. My physician has explained to me the nature and purpose of the operation or other procedure, possible alternative methods of treatment, the risks involved and the possibility of complications to me. I understand the probable consequences of declining the recommended procedure and the alternative methods of treatment. I acknowledge that no guarantee has been made as to the result that may be obtained. 3. I recognize that during the course of this operation or other procedure, unforeseen conditions may necessitate additional or different procedures than those listed above. I, therefore, further authorize and request that the above-named physician, his/her physician assistants, or designees perform such procedures as are, in his/her professional opinion, necessary and desirable. If I have a Do Not Attempt Resuscitation (DNAR) order in place, that status will be suspended while in the operating room, procedural suite, and during the recovery period unless otherwise explicitly stated by me (or a person authorized to consent on my behalf). The surgeon or my attending physician will determine when the applicable recovery period ends for purposes of reinstating the DNAR order. 4. Should the need arise during my operation or immediate post-operative period; I also consent to the administration of blood and/or blood products.  Further, I understand that despite careful testing and screening of blood and blood products, I may still be subject to ill effects as a result of recieving a blood transfusion an/or blood producst. The following are some, but not all, of the potential risks that can occur: fever and allergic reactions, hemolytic reactions, transmission of disease such as hepatitis, AIDS, cytomegalovirus (CMV), and flluid overload. In the event that I wish to have autologous transfusions of my own blood, or a directed donor transfusion, I will discuss this with my physician. 5. I consent to the photographing of the operations or procedures to be performed for the purposes of advancing medicine, science, and/or education, provided my identity is not revealed. If the procedure has been videotaped, the physician/surgeon will obtain the original videotape. The [facility] will not be responsible for storage or maintenance of this tape. 6. I consent to the presence of a  or observer as deemed necessary by my physician or his designee. 7. Any tissues or organs removed in the operation or other procedure may be disposed of by and at the discretion of [facility].    8. I understand that the physician and his/her physician assistants may not be employees or agents of [facility], [facility], nor [facility], but are independent medical practitioners who have been permitted to use its facilities for the care and treatment of their patients. 9. Patients having a sterilization procedure: I understand that if the procedure is successful the results will be permanent and it will therefore be impossible for me to inseminate, conceive or bear children. I also understand that the procedure is intended to result in sterility, although the result has not been guaranteed. 10. I CERTIFY THAT I HAVE READ AND FULLY UNDERSTAND THE ABOVE CONSENT TO OPERATION and/or OTHER PROCEDURE. 11. I acknowledge that my physician has explained sedation/analgesia administration to me including the risks and benefits.  I consent to the administration of sedation/analgesia as may be necessary or desirable in the judgment of my physician. Signature of Patient:  ________________________________________________ Date: _________Time: _________    Responsible person in case of minor or unconscious: _____________________________Relationship: ____________     Witness Signature: ____________________________________________ Date: __________ Time: ___________    Statement of Physician  My signature below affirms that prior to the time of the procedure, I have explained to the patient and/or her legal representative, the risks and benefits involved in the proposed treatment and any reasonable alternative to the proposed treatment. I have also explained the risks and benefits involved in the refusal of the proposed treatment and have answered the patient's questions. If I have a significant financial interest in this procedure/surgery, I have disclosed this and had a discussion with my patient.     Signature of Physician:   ________________________________________Date: _________Time:_______ Patient Name: Karlos Byrnes  : 11/3/1975   Printed: 2022    Medical Record #: [id number]

## (undated) NOTE — MR AVS SNAPSHOT
After Visit Summary   7/6/2021    Jaycee Blackwood    MRN: [id number]           Visit Information     Date & Time  7/6/2021 10:30 AM Provider  EM [facility] [address] [address] Dept.  Phone  [phone] for a Video Visit is currently $35.         If you receive a survey from [employer] please take a few minutes to complete it and provide feedback. We strive to deliver the best patient experience and are looking for ways to make improvements.  Your feedba $120*     EMERGENCY ROOM Life-threatening emergencies needing immediate intervention at a hospital emergency room.  Average cost  $2,300*   *Cost varies based on your insurance coverage  For more information about hours, locations or appointment options karla

## (undated) NOTE — LETTER
[facility]  Administration of Anesthesia  1. I, Hernan Chu, or _________________________________ acting on her behalf, (Patient) (Dependent/Representative) request to receive anesthesia for my pending procedure/operation/treatment. A physician (anesthesiologist) alone or an anesthesiologist working with a nurse anesthetist may administer my anesthesia. 2. I understand that my anesthesiologist is not an employee or agent of the hospital, but is an independent medical practitioner who has been permitted to use its facilities for the care and treatment of his/her patients. 3. I acknowledge that a physician from [facility] or their designate(s), recommended anesthesia for me using her/his medical judgment. The type(s) of anesthesia I may receive include:                a) General Anesthesia, b) Spinal/Epidural Anesthesia, c) Regional Anesthesia or d) Monitored Anesthesia Care. 4. If my spinal, regional or monitored anesthesia care (local) is not satisfactory for my comfort, or if my medical condition requires, I consent to the administration of general anesthesia. 5. I am aware that the practice of anesthesiology is not an exact science and that some foreseeable risks or consequences may occur. Some common risks/consequences include sore throat and hoarseness, nausea and vomiting, muscle soreness, backache, damage to the mouth/teeth/vocal cords and eye injury. I understand that more rare but serious potential risks of anesthesia include blood pressure changes, drug reactions, cardiac arrest, brain damage, paralysis or death. These risks apply to whether I have general, spinal/epidural, regional or monitored anesthesia care. 6. OBSTETRIC PATIENTS: Specific risks/consequences of spinal/epidural anesthesia may include itching, low blood pressure, difficulty urinating, slowing of the baby's heart rate and headache.  Rare risks include infections, high spinal block, spinal bleeding, seizure, cardiac arrest and death. 7. AWARENESS: I understand that it is possible (but unlikely) to have explicit memory of events from the operating room while under general anesthesia. 8. ELECTROCONVULSIVE THERAPY PATIENTS: This consent serves for all treatments in a single course of therapy. 9. I understand that I must inform my anesthesiologist when I last ate and/or drank to minimize the risk of anesthesia. 10. If I am pregnant, or may pregnant, I understand that elective surgery should be postponed until after the baby is born. Anesthetics cross the placenta and may temporarily anesthetize the baby. Although fetal complications of anesthesia during pregnancy are rare, they may include birth defects, premature labor, brain damage and death. 11. I certify that I informed the anesthesiologist, to the best of my ability, about medication I take including blood thinners, anticoagulants, herbal remedies, narcotics and recreational drugs (e.g. cocaine, marijuana, PCP). Failure to inform my anesthesiologist about these medicines may increase my risk of anesthetic complications. The nature and purpose of my anesthetic management was explained to me. I had the opportunity to ask questions and the answers and information provided meet my satisfaction.   I retain the right to withdraw this consent at any time prior to the administration of said anesthetic.    ___________________________________________________           _____________________________________________________  Patient Signature                                                                                      Witness Signature                ___________________________________________________           _____________________________________________________  Date/Time                                                                                               Responsible person in case of minor/ unconscious pt /Relationship    My signature below affirms that prior to the time of the procedure, I have explained to the patient and/or his/her guardian, the risks and benefits of undergoing anesthesia, as well as any reasonable alternatives.     ___________________________________________________            _____________________________________________________  Physician Signature                            Date/Time  Patient Name: Ziggy Suarez     : 11/3/1975     Printed: 2022      Medical Record #: [id number]                              Page 1 of 1    ----------ANESTHESIA CONSENT----------

## (undated) NOTE — MR AVS SNAPSHOT
After Visit Summary   5/24/2021    [facility]    MRN: [id number]           Visit Information     Date & Time  5/24/2021  9:30 AM Provider  [facility] [address] [address] Dept.  Phone  [phone]      [facility] now offers Video Visits through [address] for adult and pediatric patients. Video Visits are available Monday - Friday for many common conditions such as allergies, colds, cough, fever, rash, sore throat, headache and pink eye.   The cost for a Vide Jessica   Monday – Friday  4:00 pm – 10:00 pm   Saturday – Sunday  10:00 am – 4:00 pm  WALK-IN CARE  Emergency Medicine Providers  Conditions needing urgent attention, but are   non-life-threatening.     Also available by appointment Average cost  $120*     EM

## (undated) NOTE — MR AVS SNAPSHOT
After Visit Summary   6/7/2021    Jasmin Garnica    MRN: [id number]           Visit Information     Date & Time  6/7/2021  9:30 AM Provider  [facility] [address] [address] Dept.  Phone  [phone] [address] [place] - Infusion    11/15/2021 1:00 PM IM TREATMENT NURSE Internal Medicine [facility] offers Video Visits through [address] for adult and pediatric patients.   Video Visits are available Monday - Friday for m Mayela Porter.   Monday – Friday  10:00 am – 10:00 pm   Saturday – Sunday  10:00 am – 4:00 pm     Publimindon SnapMD   Monday – Friday  4:00 pm – 10:00 pm   Saturday – Sunday  10:00 am – 4:00 pm  WALK-IN CARE  Emergency Medicine Providers  Ana